# Patient Record
Sex: FEMALE | Race: WHITE | NOT HISPANIC OR LATINO | Employment: FULL TIME | URBAN - METROPOLITAN AREA
[De-identification: names, ages, dates, MRNs, and addresses within clinical notes are randomized per-mention and may not be internally consistent; named-entity substitution may affect disease eponyms.]

---

## 2023-06-13 PROBLEM — E78.5 HYPERLIPIDEMIA: Status: ACTIVE | Noted: 2023-06-13

## 2023-06-13 PROBLEM — E03.9 HYPOTHYROIDISM: Status: ACTIVE | Noted: 2023-06-13

## 2023-06-13 PROBLEM — R12 HEART BURN: Status: ACTIVE | Noted: 2023-06-13

## 2023-06-13 PROBLEM — I78.1 SPIDER VEINS: Status: ACTIVE | Noted: 2023-06-13

## 2023-06-13 PROBLEM — K21.9 ACID REFLUX: Status: ACTIVE | Noted: 2023-06-13

## 2023-06-13 PROBLEM — I83.93 VARICOSE VEINS OF LEGS: Status: ACTIVE | Noted: 2023-06-13

## 2023-06-13 PROBLEM — R79.89 ABNORMAL TSH: Status: ACTIVE | Noted: 2023-06-13

## 2023-06-13 RX ORDER — PANTOPRAZOLE SODIUM 40 MG/1
1 TABLET, DELAYED RELEASE ORAL DAILY
COMMUNITY
Start: 2022-04-14 | End: 2023-06-14 | Stop reason: SDUPTHER

## 2023-06-13 RX ORDER — PROGESTERONE 200 MG/1
1 CAPSULE ORAL DAILY
COMMUNITY
Start: 2022-08-03 | End: 2023-09-18 | Stop reason: ALTCHOICE

## 2023-06-13 RX ORDER — ESTRADIOL 1 MG/1
1 TABLET ORAL DAILY
COMMUNITY
End: 2023-06-14

## 2023-06-13 RX ORDER — LEVOTHYROXINE SODIUM 50 UG/1
1 TABLET ORAL DAILY
COMMUNITY
Start: 2023-03-08 | End: 2023-06-14 | Stop reason: SDUPTHER

## 2023-06-14 ENCOUNTER — LAB (OUTPATIENT)
Dept: LAB | Facility: LAB | Age: 58
End: 2023-06-14
Payer: COMMERCIAL

## 2023-06-14 ENCOUNTER — OFFICE VISIT (OUTPATIENT)
Dept: PRIMARY CARE | Facility: CLINIC | Age: 58
End: 2023-06-14
Payer: COMMERCIAL

## 2023-06-14 VITALS
OXYGEN SATURATION: 98 % | HEIGHT: 65 IN | WEIGHT: 154.38 LBS | DIASTOLIC BLOOD PRESSURE: 70 MMHG | BODY MASS INDEX: 25.72 KG/M2 | SYSTOLIC BLOOD PRESSURE: 120 MMHG | TEMPERATURE: 97.8 F | HEART RATE: 74 BPM | RESPIRATION RATE: 18 BRPM

## 2023-06-14 DIAGNOSIS — E03.8 OTHER SPECIFIED HYPOTHYROIDISM: ICD-10-CM

## 2023-06-14 DIAGNOSIS — E78.2 MIXED HYPERLIPIDEMIA: ICD-10-CM

## 2023-06-14 DIAGNOSIS — K21.9 GASTROESOPHAGEAL REFLUX DISEASE, UNSPECIFIED WHETHER ESOPHAGITIS PRESENT: ICD-10-CM

## 2023-06-14 DIAGNOSIS — Z00.00 ROUTINE GENERAL MEDICAL EXAMINATION AT A HEALTH CARE FACILITY: Primary | ICD-10-CM

## 2023-06-14 DIAGNOSIS — Z12.31 ENCOUNTER FOR SCREENING MAMMOGRAM FOR MALIGNANT NEOPLASM OF BREAST: ICD-10-CM

## 2023-06-14 LAB
ALANINE AMINOTRANSFERASE (SGPT) (U/L) IN SER/PLAS: 13 U/L (ref 7–45)
ALBUMIN (G/DL) IN SER/PLAS: 4 G/DL (ref 3.4–5)
ALKALINE PHOSPHATASE (U/L) IN SER/PLAS: 65 U/L (ref 33–110)
ANION GAP IN SER/PLAS: 12 MMOL/L (ref 10–20)
ASPARTATE AMINOTRANSFERASE (SGOT) (U/L) IN SER/PLAS: 16 U/L (ref 9–39)
BASOPHILS (10*3/UL) IN BLOOD BY AUTOMATED COUNT: 0.04 X10E9/L (ref 0–0.1)
BASOPHILS/100 LEUKOCYTES IN BLOOD BY AUTOMATED COUNT: 0.9 % (ref 0–2)
BILIRUBIN TOTAL (MG/DL) IN SER/PLAS: 0.5 MG/DL (ref 0–1.2)
CALCIUM (MG/DL) IN SER/PLAS: 8.8 MG/DL (ref 8.6–10.3)
CARBON DIOXIDE, TOTAL (MMOL/L) IN SER/PLAS: 28 MMOL/L (ref 21–32)
CHLORIDE (MMOL/L) IN SER/PLAS: 104 MMOL/L (ref 98–107)
CHOLESTEROL (MG/DL) IN SER/PLAS: 171 MG/DL (ref 0–199)
CHOLESTEROL IN HDL (MG/DL) IN SER/PLAS: 48.3 MG/DL
CHOLESTEROL/HDL RATIO: 3.5
CREATININE (MG/DL) IN SER/PLAS: 0.62 MG/DL (ref 0.5–1.05)
EOSINOPHILS (10*3/UL) IN BLOOD BY AUTOMATED COUNT: 0.06 X10E9/L (ref 0–0.7)
EOSINOPHILS/100 LEUKOCYTES IN BLOOD BY AUTOMATED COUNT: 1.3 % (ref 0–6)
ERYTHROCYTE DISTRIBUTION WIDTH (RATIO) BY AUTOMATED COUNT: 12.6 % (ref 11.5–14.5)
ERYTHROCYTE MEAN CORPUSCULAR HEMOGLOBIN CONCENTRATION (G/DL) BY AUTOMATED: 33.1 G/DL (ref 32–36)
ERYTHROCYTE MEAN CORPUSCULAR VOLUME (FL) BY AUTOMATED COUNT: 89 FL (ref 80–100)
ERYTHROCYTES (10*6/UL) IN BLOOD BY AUTOMATED COUNT: 4.6 X10E12/L (ref 4–5.2)
GFR FEMALE: >90 ML/MIN/1.73M2
GLUCOSE (MG/DL) IN SER/PLAS: 89 MG/DL (ref 74–99)
HEMATOCRIT (%) IN BLOOD BY AUTOMATED COUNT: 40.8 % (ref 36–46)
HEMOGLOBIN (G/DL) IN BLOOD: 13.5 G/DL (ref 12–16)
IMMATURE GRANULOCYTES/100 LEUKOCYTES IN BLOOD BY AUTOMATED COUNT: 0.2 % (ref 0–0.9)
LDL: 94 MG/DL (ref 0–99)
LEUKOCYTES (10*3/UL) IN BLOOD BY AUTOMATED COUNT: 4.6 X10E9/L (ref 4.4–11.3)
LYMPHOCYTES (10*3/UL) IN BLOOD BY AUTOMATED COUNT: 1.52 X10E9/L (ref 1.2–4.8)
LYMPHOCYTES/100 LEUKOCYTES IN BLOOD BY AUTOMATED COUNT: 33 % (ref 13–44)
MONOCYTES (10*3/UL) IN BLOOD BY AUTOMATED COUNT: 0.49 X10E9/L (ref 0.1–1)
MONOCYTES/100 LEUKOCYTES IN BLOOD BY AUTOMATED COUNT: 10.6 % (ref 2–10)
NEUTROPHILS (10*3/UL) IN BLOOD BY AUTOMATED COUNT: 2.49 X10E9/L (ref 1.2–7.7)
NEUTROPHILS/100 LEUKOCYTES IN BLOOD BY AUTOMATED COUNT: 54 % (ref 40–80)
PLATELETS (10*3/UL) IN BLOOD AUTOMATED COUNT: 292 X10E9/L (ref 150–450)
POTASSIUM (MMOL/L) IN SER/PLAS: 4.1 MMOL/L (ref 3.5–5.3)
PROTEIN TOTAL: 6.8 G/DL (ref 6.4–8.2)
SODIUM (MMOL/L) IN SER/PLAS: 140 MMOL/L (ref 136–145)
THYROTROPIN (MIU/L) IN SER/PLAS BY DETECTION LIMIT <= 0.05 MIU/L: 5.94 MIU/L (ref 0.44–3.98)
THYROXINE (T4) FREE (NG/DL) IN SER/PLAS: 0.83 NG/DL (ref 0.61–1.12)
TRIGLYCERIDE (MG/DL) IN SER/PLAS: 145 MG/DL (ref 0–149)
UREA NITROGEN (MG/DL) IN SER/PLAS: 8 MG/DL (ref 6–23)
VLDL: 29 MG/DL (ref 0–40)

## 2023-06-14 PROCEDURE — 90471 IMMUNIZATION ADMIN: CPT | Performed by: FAMILY MEDICINE

## 2023-06-14 PROCEDURE — 80053 COMPREHEN METABOLIC PANEL: CPT

## 2023-06-14 PROCEDURE — 90715 TDAP VACCINE 7 YRS/> IM: CPT | Performed by: FAMILY MEDICINE

## 2023-06-14 PROCEDURE — 85025 COMPLETE CBC W/AUTO DIFF WBC: CPT

## 2023-06-14 PROCEDURE — 1036F TOBACCO NON-USER: CPT | Performed by: FAMILY MEDICINE

## 2023-06-14 PROCEDURE — 84439 ASSAY OF FREE THYROXINE: CPT

## 2023-06-14 PROCEDURE — 84443 ASSAY THYROID STIM HORMONE: CPT

## 2023-06-14 PROCEDURE — 36415 COLL VENOUS BLD VENIPUNCTURE: CPT

## 2023-06-14 PROCEDURE — 80061 LIPID PANEL: CPT

## 2023-06-14 PROCEDURE — 99396 PREV VISIT EST AGE 40-64: CPT | Performed by: FAMILY MEDICINE

## 2023-06-14 RX ORDER — PANTOPRAZOLE SODIUM 40 MG/1
40 TABLET, DELAYED RELEASE ORAL DAILY
Qty: 90 TABLET | Refills: 1 | Status: SHIPPED | OUTPATIENT
Start: 2023-06-14

## 2023-06-14 RX ORDER — LEVOTHYROXINE SODIUM 50 UG/1
50 TABLET ORAL DAILY
Qty: 90 TABLET | Refills: 1 | Status: SHIPPED | OUTPATIENT
Start: 2023-06-14 | End: 2023-09-18 | Stop reason: ALTCHOICE

## 2023-06-14 ASSESSMENT — PATIENT HEALTH QUESTIONNAIRE - PHQ9
2. FEELING DOWN, DEPRESSED OR HOPELESS: NOT AT ALL
1. LITTLE INTEREST OR PLEASURE IN DOING THINGS: NOT AT ALL
SUM OF ALL RESPONSES TO PHQ9 QUESTIONS 1 AND 2: 0

## 2023-06-14 NOTE — PROGRESS NOTES
"Subjective   Patient ID: Diana Fournier is a 58 y.o. female who presents for annual exam and checkup along with her GERD and Hypothyroidism.  HPI  Annual exam and checkup  Hypothyroidism  Taking Synthroid 50 mcg  Taking on an empty stomach  No significant weight loss/gain  No heat/cold intolerances  No increase in hair loss/dry skin    Review of systems  ; Patient seen today for exam denies any problems with headaches or vision, denies any shortness of breath chest pain nausea or vomiting, no black stool no blood in the stool no heartburn type symptoms denies any problems with constipation or diarrhea, and no dysuria-type symptoms    The patient's allergies medications were reviewed with them today    The patient's social family and surgical history or also reviewed here today, along with her past medical history.     Objective     Alert and active in  no acute distress  HEENT TMs clear oropharynx negative nares clear no drainage noted neck supple  With no adenopathy   Heart regular rate and rhythm without murmur and no carotid bruits  Lungs- clear to auscultation bilaterally, no wheeze or rhonchi noted  Thyroid -negative masses or nodularity  Abdomen- soft times four quadrants , bowel sounds positive no masses or organomegaly, negative tenderness guarding or rebound  Neurological exam unremarkable- DTRs in upper and lower extremities within normal limits.   skin -no lesions noted      /70 (BP Location: Right arm, Patient Position: Sitting, BP Cuff Size: Adult)   Pulse 74   Temp 36.6 °C (97.8 °F) (Temporal)   Resp 18   Ht 1.651 m (5' 5\")   Wt 70 kg (154 lb 6 oz)   SpO2 98%   BMI 25.69 kg/m²     No Known Allergies    Assessment/Plan   Problem List Items Addressed This Visit       Acid reflux    Relevant Medications    pantoprazole (ProtoNix) 40 mg EC tablet    Hyperlipidemia    Relevant Orders    CBC and Auto Differential    Comprehensive Metabolic Panel    Lipid Panel    Hypothyroidism    Relevant " Medications    levothyroxine (Synthroid, Levoxyl) 50 mcg tablet    Other Relevant Orders    CBC and Auto Differential    Comprehensive Metabolic Panel    Lipid Panel    Thyroid Stimulating Hormone    Thyroxine, Free     Other Visit Diagnoses       Routine general medical examination at a health care facility    -  Primary    Relevant Orders    Tdap vaccine, age 10 years and older  (BOOSTRIX) (Completed)    Encounter for screening mammogram for malignant neoplasm of breast        Relevant Orders    BI mammo bilateral screening tomosynthesis              If anything worsens or changes please call us at once, follow up in the office as planned,

## 2023-06-16 DIAGNOSIS — E03.9 HYPOTHYROIDISM, UNSPECIFIED TYPE: ICD-10-CM

## 2023-06-16 RX ORDER — LEVOTHYROXINE SODIUM 75 UG/1
75 TABLET ORAL DAILY
Qty: 30 TABLET | Refills: 2 | Status: SHIPPED | OUTPATIENT
Start: 2023-06-16 | End: 2023-09-05 | Stop reason: SDUPTHER

## 2023-06-16 NOTE — TELEPHONE ENCOUNTER
----- Message from Davin Fabian DO sent at 6/15/2023  5:28 PM EDT -----  Reviewed her labs they all look really good cholesterol is much improved blood count sugar kidneys all good,, thyroid is still little bit lazy,, we can increase her to 75 mcg every day if she has lots of 50s she can take like an extra 1/week let me know we can make the adjustment for her and her prescription think that will help a little bit more energy also

## 2023-09-05 DIAGNOSIS — E03.9 HYPOTHYROIDISM, UNSPECIFIED TYPE: ICD-10-CM

## 2023-09-05 RX ORDER — LEVOTHYROXINE SODIUM 75 UG/1
75 TABLET ORAL DAILY
Qty: 30 TABLET | Refills: 3 | Status: SHIPPED | OUTPATIENT
Start: 2023-09-05 | End: 2023-12-06 | Stop reason: SDUPTHER

## 2023-09-05 NOTE — TELEPHONE ENCOUNTER
Rx Refill Request Telephone Encounter    Name:  Diana Fournier  : 1965     Medication Name:  Levothyroxine 75 mcg  Quantity (Optional):    30 Refill: 2  Directions (Optional):   Take 1 tablet (75 mcg) by mouth once daily.     Specific [facility] location:  [facility]    Date of last appointment:  23

## 2023-09-13 PROBLEM — Z01.419 ENCOUNTER FOR ANNUAL ROUTINE GYNECOLOGICAL EXAMINATION: Status: ACTIVE | Noted: 2023-09-13

## 2023-09-18 ENCOUNTER — PROCEDURE VISIT (OUTPATIENT)
Dept: PRIMARY CARE | Facility: CLINIC | Age: 58
End: 2023-09-18
Payer: COMMERCIAL

## 2023-09-18 VITALS
DIASTOLIC BLOOD PRESSURE: 80 MMHG | HEART RATE: 60 BPM | WEIGHT: 153.4 LBS | OXYGEN SATURATION: 99 % | SYSTOLIC BLOOD PRESSURE: 118 MMHG | HEIGHT: 65 IN | RESPIRATION RATE: 16 BRPM | BODY MASS INDEX: 25.56 KG/M2 | TEMPERATURE: 98.4 F

## 2023-09-18 DIAGNOSIS — H92.01 OTALGIA OF RIGHT EAR: ICD-10-CM

## 2023-09-18 DIAGNOSIS — Z12.4 SCREENING FOR CERVICAL CANCER: ICD-10-CM

## 2023-09-18 DIAGNOSIS — Z01.419 ENCOUNTER FOR ANNUAL ROUTINE GYNECOLOGICAL EXAMINATION: Primary | ICD-10-CM

## 2023-09-18 PROCEDURE — 99213 OFFICE O/P EST LOW 20 MIN: CPT | Performed by: NURSE PRACTITIONER

## 2023-09-18 PROCEDURE — 87624 HPV HI-RISK TYP POOLED RSLT: CPT

## 2023-09-18 PROCEDURE — 88175 CYTOPATH C/V AUTO FLUID REDO: CPT

## 2023-09-18 ASSESSMENT — ENCOUNTER SYMPTOMS
DIZZINESS: 0
FEVER: 0
CONSTIPATION: 0
SORE THROAT: 0
PALPITATIONS: 0
SHORTNESS OF BREATH: 0
DIARRHEA: 0
WEAKNESS: 0
VOMITING: 0
ABDOMINAL PAIN: 0
COUGH: 0
FATIGUE: 1
DIFFICULTY URINATING: 0
CHILLS: 0
SINUS PAIN: 0
DYSURIA: 0

## 2023-09-18 NOTE — PATIENT INSTRUCTIONS
"Cervical cancer screening tests    The Basics  Written by the doctors and editors at [facility]  What is cervical cancer screening? -- Screening tests look for cancer cells in the cervix. The cervix is the bottom part of the uterus, where it meets the vagina (figure 1).  Screening tests also look for cells that could turn into cancer, called \"precancer.\" They can find cervical cancer and precancer in the early stages, when it can be treated or even cured.  What tests are used to screen for cervical cancer? -- There are a few different ways to screen:  ?Pap test - This is sometimes called a \"Pap smear.\" It involves taking cells from the surface of the cervix and sending them to a lab. Then, an expert will check the cells under a microscope.  ?HPV test - HPV stands for \"human papillomavirus.\" Some types of this virus can cause cervical cancer. An HPV test involves testing cells from the cervix for certain types of HPV.  ?Combination test - This means doing a Pap and HPV test at the same time.  What happens during a Pap or HPV test? -- For both types of tests, your doctor will take cells from the surface of your cervix. To do this, they will gently insert a device called a \"speculum\" into your vagina. The device helps to push apart the walls of your vagina so that the doctor can see the cervix. Then, they will use a small tool to lightly scrape cells from the surface of your cervix. The tool looks like a small spatula or brush. This might be a little uncomfortable, but usually does not hurt.  When should I start being screened for cervical cancer? -- Most experts recommend that you start having Pap tests when you turn 21. Some experts recommend HPV tests instead of Pap tests, starting at age 25. But this option might not be available in many places. Your doctor or nurse can talk to you about your options.  You should start getting Pap tests at the recommended age, whether or not you have ever been sexually active. Also, " "you do not need to start cervical cancer screening before age 21, even if you became sexually active at a younger age.  What should I do to prepare for a Pap or HPV test? -- You do not need to do anything special to prepare. People sometimes hear that they should not have sex or put anything in their vagina for 2 days before a Pap test, but this is not necessary. Pap tests work fine even if you have had sex recently.  Your doctor might recommend scheduling your test for when you do not expect to have your period. But don't worry if you do have your period on the day of the test. Screening can usually still be done even if you are bleeding. Your doctor can talk with you and let you know what to do.  How often should I be screened for cervical cancer? -- That depends on how old you are and the results of your past tests.  ?If you are age 21 to 29, you should have a Pap test every 3 years. Or, if your doctor recommends HPV testing instead, you should have a test every 5 years beginning at age 25.  ?If you are age 30 or older, you can have a Pap test every 3 years. The other options are having an HPV test every 5 years or a combination Pap and HPV test every 5 years.  ?If you are age 65 or older, you can stop having Pap tests if:  You had Pap tests done regularly until you turned 65.  You had 3 normal Pap tests in a row, or 2 normal combination Pap and HPV tests over the past 10 years (if the most recent test was within the past 5 years).  You do not have other medical conditions that could weaken your immune system - These include taking certain medicines or having HIV.  You might also get a Pap test for reasons other than cervical cancer screening. For example, if you have abnormal vaginal bleeding, your doctor might do a Pap test to try to figure out the cause.  Do I need to be screened for cervical cancer if I had a hysterectomy? -- If you have had surgery called a \"hysterectomy\" to remove your uterus, ask your " "doctor if you need to keep getting screened. After a hysterectomy, you probably do not need screening if:  ?Your cervix was removed along with your uterus, and  ?You did not have cervical cancer or precancer (sometimes called \"dysplasia\")  If you're not sure, your doctor can help you figure out if you need to continue screening.  Do I need to get screening tests if I had the HPV vaccine? -- Yes. Getting the HPV vaccine lowers your chances of getting an HPV infection that could lead to cervical cancer. But it does not completely protect you. You should still be screened for cancer or precancer.  What if I have an abnormal Pap test result? -- Abnormal Pap tests are common, and most people with an abnormal Pap test do not have cancer. If your Pap test has cells that look abnormal, your doctor or nurse can do more tests to figure out what is causing this. They will decide what to do based on your age, what your Pap test shows, and the results of any other tests you had.  Follow-up tests might include:  ?An HPV test - If you haven't already had an HPV test, your doctor might order one. They might be able to do this on the cells already taken during your Pap test.  ?Another Pap test in 12 months - Sometimes, if you wait a year and have another Pap test, you could find that the abnormal cells are back to normal. You might also have an HPV test at the same time.  ?A colposcopy - For this test, the doctor or nurse will use a speculum to look at your cervix, just like during a Pap test. But they will look more closely using a device that looks like a microscope. It allows the doctor or nurse to see the cervix in more detail. During this test, the doctor or nurse might also take tiny samples of tissue from the cervix. This is called a \"biopsy.\" The tissue samples are checked in a lab.  If you do have cervical cancer or precancer, there are effective treatments available. If your condition was found early, there is a good " chance that you can be cured.  What if my HPV test comes back positive? -- Most people who have sex will be exposed to HPV at some point, and having HPV does not mean that you will definitely get cancer. For most people, HPV infection goes away on its own. But for some people, it does not. Long-lasting HPV infection increases your risk of cancer over time.  If your HPV test comes back positive, your doctor or nurse will talk with you about what to do. This will partly depend on whether your Pap test results were abnormal. If your HPV test is positive but your Pap test is normal, you might need to repeat the tests in 1 year so your doctor can see if anything has changed.

## 2023-09-18 NOTE — PROGRESS NOTES
"Subjective   Patient ID: Diana Fournier is a 58 y.o. female who presents for Gynecologic Exam (Patient presents in office for a pap smear. Patient admits she has had a abnormal pap smear in the past.) and Earache (Patient presents in office with a complaint of right ear pain. Patient admits for the last few days she has been experiencing hearing difficulties and ear pain. Patient denies any tinnitus. Patient has not had to taken any otc treatment. ).  Patient reports abnormal paps in the past; her last pap was normal.   Mammogram ordered by PCP at last appointment  Cologuard done April 2022      HPI     Review of Systems   Constitutional:  Positive for fatigue. Negative for chills and fever.   HENT:  Positive for ear pain. Negative for sinus pain and sore throat.    Respiratory:  Negative for cough and shortness of breath.    Cardiovascular:  Negative for chest pain and palpitations.   Gastrointestinal:  Negative for abdominal pain, constipation, diarrhea and vomiting.   Genitourinary:  Negative for difficulty urinating, dyspareunia, dysuria, pelvic pain and vaginal pain.   Skin:  Negative for rash.   Neurological:  Negative for dizziness and weakness.       Objective   /80 (BP Location: Left arm, Patient Position: Sitting, BP Cuff Size: Adult)   Pulse 60   Temp 36.9 °C (98.4 °F) (Temporal)   Resp 16   Ht 1.651 m (5' 5\")   Wt 69.6 kg (153 lb 6.4 oz)   SpO2 99%   BMI 25.53 kg/m²     Physical Exam  Vitals and nursing note reviewed. Exam conducted with a chaperone present.   Constitutional:       General: She is not in acute distress.     Appearance: Normal appearance.   HENT:      Right Ear: Tympanic membrane normal.      Left Ear: Tympanic membrane normal.   Neck:      Comments: No enlargement of thyroid noted  Cardiovascular:      Rate and Rhythm: Normal rate and regular rhythm.      Heart sounds: Normal heart sounds.   Pulmonary:      Effort: Pulmonary effort is normal.      Breath sounds: Normal " breath sounds.   Chest:   Breasts:     Right: Normal. No swelling, mass, skin change or tenderness.      Left: No swelling, mass, skin change or tenderness.   Abdominal:      General: Abdomen is flat.      Palpations: Abdomen is soft.      Tenderness: There is no abdominal tenderness.   Genitourinary:     General: Normal vulva.      Vagina: Normal.      Cervix: No cervical motion tenderness, friability, lesion or erythema.      Uterus: Normal.       Adnexa: Right adnexa normal and left adnexa normal.   Lymphadenopathy:      Cervical: No cervical adenopathy.      Upper Body:      Right upper body: No supraclavicular or axillary adenopathy.      Left upper body: No supraclavicular or axillary adenopathy.   Neurological:      Mental Status: She is alert.         Assessment/Plan   Problem List Items Addressed This Visit       Encounter for annual routine gynecological examination - Primary     Other Visit Diagnoses       Screening for cervical cancer        Relevant Orders    THINPREP PAP TEST        PAP obtained, will call patient with any abnormal result.   Encouraged to complete mammogram as ordered.   Last cologuard lab reviewed.   Reassured patient that there are no signs of infection of ear today.   Follow up as needed with any additional concerns.

## 2023-09-28 ENCOUNTER — TELEPHONE (OUTPATIENT)
Dept: PRIMARY CARE | Facility: CLINIC | Age: 58
End: 2023-09-28
Payer: COMMERCIAL

## 2023-09-28 ENCOUNTER — LAB (OUTPATIENT)
Dept: LAB | Facility: LAB | Age: 58
End: 2023-09-28
Payer: COMMERCIAL

## 2023-09-28 DIAGNOSIS — R79.89 ABNORMAL TSH: ICD-10-CM

## 2023-09-28 LAB
COMPLETE PATHOLOGY REPORT: NORMAL
CONVERTED CLINICAL DIAGNOSIS-HISTORY: NORMAL
CONVERTED DIAGNOSIS COMMENT: NORMAL
CONVERTED FINAL DIAGNOSIS: NORMAL
CONVERTED FINAL REPORT PDF LINK TO COPY AND PASTE: NORMAL
THYROTROPIN (MIU/L) IN SER/PLAS BY DETECTION LIMIT <= 0.05 MIU/L: 2.8 MIU/L (ref 0.44–3.98)

## 2023-09-28 PROCEDURE — 84443 ASSAY THYROID STIM HORMONE: CPT

## 2023-09-28 PROCEDURE — 86800 THYROGLOBULIN ANTIBODY: CPT

## 2023-09-28 PROCEDURE — 84436 ASSAY OF TOTAL THYROXINE: CPT

## 2023-09-28 PROCEDURE — 36415 COLL VENOUS BLD VENIPUNCTURE: CPT

## 2023-09-28 NOTE — TELEPHONE ENCOUNTER
Patient is calling because she had a question about her thyroid blood work. She set up a 6 month follow up with you for December but she wanted to know what you check when you do the thyroid blood test? She wanted to know if you could check for Hashimoto's. Could you order the lab so that she could get it done prior to her appointment in December?    Please advise  Thanks      Pt's # [phone]

## 2023-09-29 LAB — THYROXINE (T4) (UG/DL) IN SER/PLAS: 8.9 UG/DL (ref 4.5–11.1)

## 2023-09-30 ENCOUNTER — TELEPHONE (OUTPATIENT)
Dept: PRIMARY CARE | Facility: CLINIC | Age: 58
End: 2023-09-30
Payer: COMMERCIAL

## 2023-09-30 DIAGNOSIS — N76.0 BV (BACTERIAL VAGINOSIS): Primary | ICD-10-CM

## 2023-09-30 DIAGNOSIS — B96.89 BV (BACTERIAL VAGINOSIS): Primary | ICD-10-CM

## 2023-09-30 RX ORDER — METRONIDAZOLE 500 MG/1
500 TABLET ORAL 2 TIMES DAILY
Qty: 14 TABLET | Refills: 0 | Status: SHIPPED | OUTPATIENT
Start: 2023-09-30 | End: 2023-10-07

## 2023-09-30 NOTE — TELEPHONE ENCOUNTER
Spoke with patient regarding results- states she is having some discharge and would like to be treated.     Requesting antibiotic to be sent to [facility] in [place] please

## 2023-10-02 LAB — THYROGLOBULIN AB (IU/ML) IN SER/PLAS: 0.9 IU/ML (ref 0–4)

## 2023-10-06 ENCOUNTER — APPOINTMENT (OUTPATIENT)
Dept: RADIOLOGY | Facility: HOSPITAL | Age: 58
End: 2023-10-06
Payer: COMMERCIAL

## 2023-10-13 ENCOUNTER — APPOINTMENT (OUTPATIENT)
Dept: RADIOLOGY | Facility: HOSPITAL | Age: 58
End: 2023-10-13
Payer: COMMERCIAL

## 2023-11-01 ENCOUNTER — TELEPHONE (OUTPATIENT)
Dept: PRIMARY CARE | Facility: CLINIC | Age: 58
End: 2023-11-01
Payer: COMMERCIAL

## 2023-11-01 NOTE — TELEPHONE ENCOUNTER
Pt calling, she has an appointment on 12/1/23. She wanted to know if she needed to get her thyroid checked or any other labs before her appointment? If she did need labs, she would prefer to discuss the results in the office with you at her appointment.     Please advise

## 2023-12-04 NOTE — PROGRESS NOTES
"Subjective   Patient ID: Diana Fournier is a 58 y.o. female who presents for Hypothyroidism.    HPI    Hypothyroidism  Taking Synthroid 75 mcg  Taking on an empty stomach  No significant weight loss/gain  No heat/cold intolerances  Admits to increase in hair loss and dry skin.    Pt is having some redness in right eye  On going for 3 day  No pain or discomfort.    Flu declined     No other concern / question    Review of systems  ; Patient seen today for exam denies any problems with headaches or vision, denies any shortness of breath chest pain nausea or vomiting, no black stool no blood in the stool no heartburn type symptoms denies any problems with constipation or diarrhea, and no dysuria-type symptoms    The patient's allergies medications were reviewed with them today    The patient's social family and surgical history or also reviewed here today, along with her past medical history.     Objective     Alert and active in  no acute distress  HEENT TMs clear oropharynx negative nares clear no drainage noted neck supple  With no adenopathy   Heart regular rate and rhythm without murmur and no carotid bruits  Lungs- clear to auscultation bilaterally, no wheeze or rhonchi noted  Thyroid -negative masses or nodularity  Abdomen- soft times four quadrants , bowel sounds positive no masses or organomegaly, negative tenderness guarding or rebound  Neurological exam unremarkable- DTRs in upper and lower extremities within normal limits.   skin -no lesions noted      /72 (BP Location: Left arm, Patient Position: Sitting, BP Cuff Size: Adult)   Pulse 62   Temp 36.5 °C (97.7 °F) (Temporal)   Resp 16   Ht 1.651 m (5' 5\")   Wt 73.8 kg (162 lb 9.6 oz)   SpO2 97%   BMI 27.06 kg/m²     Allergies   Allergen Reactions    Latex Rash       Assessment/Plan   Problem List Items Addressed This Visit       Abnormal TSH    Acid reflux    Hyperlipidemia    Hypothyroidism     Other Visit Diagnoses       Redness of eye, right "              Refill Levothyroxine.     She can try to take Biotin.  She is aware this should not be taken by Levothyroxine.     Recommend washing her face with baby shampoo.  She can also use rewetting drops as needed.    Follow up in 6 months or sooner if necessary.    If anything worsens or changes please call us at once, follow up in the office as planned.    Scribe Attestation  By signing my name below, I, Laura Elias MA, Scribe   attest that this documentation has been prepared under the direction and in the presence of Davin Fabian DO.

## 2023-12-06 ENCOUNTER — OFFICE VISIT (OUTPATIENT)
Dept: PRIMARY CARE | Facility: CLINIC | Age: 58
End: 2023-12-06
Payer: COMMERCIAL

## 2023-12-06 VITALS
DIASTOLIC BLOOD PRESSURE: 72 MMHG | HEIGHT: 65 IN | WEIGHT: 162.6 LBS | RESPIRATION RATE: 16 BRPM | HEART RATE: 62 BPM | SYSTOLIC BLOOD PRESSURE: 106 MMHG | OXYGEN SATURATION: 97 % | TEMPERATURE: 97.7 F | BODY MASS INDEX: 27.09 KG/M2

## 2023-12-06 DIAGNOSIS — K21.9 GASTROESOPHAGEAL REFLUX DISEASE, UNSPECIFIED WHETHER ESOPHAGITIS PRESENT: ICD-10-CM

## 2023-12-06 DIAGNOSIS — E03.9 HYPOTHYROIDISM, UNSPECIFIED TYPE: ICD-10-CM

## 2023-12-06 DIAGNOSIS — H11.001 PTERYGIUM EYE, RIGHT: ICD-10-CM

## 2023-12-06 DIAGNOSIS — H57.89 REDNESS OF EYE, RIGHT: ICD-10-CM

## 2023-12-06 DIAGNOSIS — E78.5 HYPERLIPIDEMIA, UNSPECIFIED HYPERLIPIDEMIA TYPE: ICD-10-CM

## 2023-12-06 DIAGNOSIS — R79.89 ABNORMAL TSH: ICD-10-CM

## 2023-12-06 PROCEDURE — 3008F BODY MASS INDEX DOCD: CPT | Performed by: FAMILY MEDICINE

## 2023-12-06 PROCEDURE — 99213 OFFICE O/P EST LOW 20 MIN: CPT | Performed by: FAMILY MEDICINE

## 2023-12-06 PROCEDURE — 1036F TOBACCO NON-USER: CPT | Performed by: FAMILY MEDICINE

## 2023-12-06 RX ORDER — LEVOTHYROXINE SODIUM 75 UG/1
75 TABLET ORAL DAILY
Qty: 30 TABLET | Refills: 5 | Status: SHIPPED | OUTPATIENT
Start: 2023-12-06 | End: 2024-06-03

## 2024-01-11 ENCOUNTER — OFFICE VISIT (OUTPATIENT)
Dept: PRIMARY CARE | Facility: CLINIC | Age: 59
End: 2024-01-11
Payer: COMMERCIAL

## 2024-01-11 VITALS
HEIGHT: 66 IN | RESPIRATION RATE: 16 BRPM | HEART RATE: 50 BPM | BODY MASS INDEX: 25.78 KG/M2 | OXYGEN SATURATION: 100 % | DIASTOLIC BLOOD PRESSURE: 80 MMHG | TEMPERATURE: 97.8 F | WEIGHT: 160.4 LBS | SYSTOLIC BLOOD PRESSURE: 118 MMHG

## 2024-01-11 DIAGNOSIS — J01.90 ACUTE NON-RECURRENT SINUSITIS, UNSPECIFIED LOCATION: Primary | ICD-10-CM

## 2024-01-11 DIAGNOSIS — H92.01 OTALGIA, RIGHT: ICD-10-CM

## 2024-01-11 DIAGNOSIS — H10.33 ACUTE BACTERIAL CONJUNCTIVITIS OF BOTH EYES: ICD-10-CM

## 2024-01-11 PROCEDURE — 99213 OFFICE O/P EST LOW 20 MIN: CPT | Performed by: NURSE PRACTITIONER

## 2024-01-11 RX ORDER — POLYMYXIN B SULFATE AND TRIMETHOPRIM 1; 10000 MG/ML; [USP'U]/ML
1 SOLUTION OPHTHALMIC
Qty: 10 ML | Refills: 0 | Status: SHIPPED | OUTPATIENT
Start: 2024-01-11 | End: 2024-01-18

## 2024-01-11 RX ORDER — CEFDINIR 300 MG/1
300 CAPSULE ORAL 2 TIMES DAILY
Qty: 20 CAPSULE | Refills: 0 | Status: SHIPPED | OUTPATIENT
Start: 2024-01-11 | End: 2024-01-21

## 2024-01-11 ASSESSMENT — ENCOUNTER SYMPTOMS
SINUS PRESSURE: 0
CHILLS: 0
EYE DISCHARGE: 1
NAUSEA: 0
EYE REDNESS: 1
ABDOMINAL PAIN: 0
VOMITING: 0
HEADACHES: 0
FEVER: 0
WHEEZING: 0
SINUS PAIN: 0
WEAKNESS: 0
OCCASIONAL FEELINGS OF UNSTEADINESS: 0
PALPITATIONS: 0
FATIGUE: 0
SORE THROAT: 0
SHORTNESS OF BREATH: 0
DIZZINESS: 0
NUMBNESS: 0
COUGH: 0
DEPRESSION: 0
LOSS OF SENSATION IN FEET: 0
EYE PAIN: 0

## 2024-01-11 NOTE — PROGRESS NOTES
"Subjective   Patient ID: Diana Fournier is a 58 y.o. female who presents for Conjunctivitis (Patient presents in office with a complaint of possible conjunctivitis. Patient admits she has noticed yellow colored drainage. Patient has tried no otc relief. ) and Earache (Patient presents in office with a complaint of right ear pain. Patient admits she has been experiencing intermittent sharp pains. Patient has tried nothing otc for relief. ).    HPI   58-year-old female presents today complaining of possible pinkeye.  She states that earlier today, her left eye felt irritated.  She went to the restroom and noticed a thick yellow drainage from the eye.  The eye does seem slightly red.  She denies any crusting upon awakening this morning.  She is also complaining of nasal congestion that has been persisting over the last 2 weeks.  Over the last few days, she has been experiencing intermittent sharp pains in her right ear.  She denies any cough, no sore throat.  No fever or chills.  She denies any history of asthma.  She was around family members who were sick, she also works at a school.  She has not tried taking anything for symptom relief.    Review of Systems   Constitutional:  Negative for chills, fatigue and fever.   HENT:  Positive for congestion and ear pain. Negative for sinus pressure, sinus pain and sore throat.    Eyes:  Positive for discharge and redness. Negative for pain and visual disturbance.   Respiratory:  Negative for cough, shortness of breath and wheezing.    Cardiovascular:  Negative for chest pain and palpitations.   Gastrointestinal:  Negative for abdominal pain, nausea and vomiting.   Neurological:  Negative for dizziness, weakness, numbness and headaches.       Objective   /80 (BP Location: Left arm, Patient Position: Sitting, BP Cuff Size: Adult)   Pulse 50   Temp 36.6 °C (97.8 °F) (Temporal)   Resp 16   Ht 1.676 m (5' 6\")   Wt 72.8 kg (160 lb 6.4 oz)   SpO2 100%   BMI 25.89 " kg/m²     Physical Exam  Vitals and nursing note reviewed.   Constitutional:       General: She is not in acute distress.     Appearance: Normal appearance.   HENT:      Right Ear: Tympanic membrane normal.      Left Ear: Tympanic membrane normal.      Nose: Congestion present.      Mouth/Throat:      Pharynx: No oropharyngeal exudate or posterior oropharyngeal erythema.   Eyes:      General:         Right eye: Discharge present.         Left eye: Discharge present.     Extraocular Movements: Extraocular movements intact.      Pupils: Pupils are equal, round, and reactive to light.      Comments: Left sclera/conjunctiva erythematous. Yellow drainage noted in eye corners.    Cardiovascular:      Rate and Rhythm: Normal rate and regular rhythm.      Heart sounds: Normal heart sounds.   Pulmonary:      Effort: Pulmonary effort is normal.      Breath sounds: Normal breath sounds. No wheezing, rhonchi or rales.   Lymphadenopathy:      Cervical: No cervical adenopathy.   Skin:     General: Skin is warm and dry.   Neurological:      Mental Status: She is alert.   Psychiatric:         Mood and Affect: Mood normal.         Behavior: Behavior normal.         Assessment/Plan   Problem List Items Addressed This Visit             ICD-10-CM    BMI 25.0-25.9,adult Z68.25     Other Visit Diagnoses         Codes    Acute non-recurrent sinusitis, unspecified location    -  Primary J01.90    Relevant Medications    cefdinir (Omnicef) 300 mg capsule    Acute bacterial conjunctivitis of both eyes     H10.33    Relevant Medications    polymyxin B sulf-trimethoprim (Polytrim) ophthalmic solution    Otalgia, right     H92.01        Sinusitis:  Will treat with cefdinir.  Increase rest and fluids.  Follow-up with PCP in 1 week with any persisting symptoms, or sooner with any additional concerns.  Conjunctivitis: Will treat with Polytrim.  Encouraged good handwashing.  Work note provided for today.  Follow-up with PCP or eye doctor with any  persisting symptoms.  If developing visual changes, to ER.

## 2024-01-11 NOTE — PATIENT INSTRUCTIONS
Today you were seen for a sinus infection.  Start antibiotic as directed and take until gone.  Increase rest and fluids.  Follow-up with your primary care provider in 1 week with any persisting symptoms, or sooner with any additional concerns.   Today you were also seen for conjunctivitis.  Start antibiotic eyedrops as directed.  Make sure to wash hands frequently and thoroughly.  Wash all sheets, towels, clothes in hot soapy water.  Follow-up with your PCP in 1 week with any persisting symptoms, or sooner with any additional concerns.  If developing any eye pain or visual changes/blurred vision, proceed to emergency department for further evaluation/treatment.

## 2024-02-15 ENCOUNTER — OFFICE VISIT (OUTPATIENT)
Dept: PRIMARY CARE | Facility: CLINIC | Age: 59
End: 2024-02-15
Payer: COMMERCIAL

## 2024-02-15 VITALS
OXYGEN SATURATION: 98 % | SYSTOLIC BLOOD PRESSURE: 136 MMHG | RESPIRATION RATE: 16 BRPM | BODY MASS INDEX: 27.26 KG/M2 | HEIGHT: 65 IN | WEIGHT: 163.6 LBS | DIASTOLIC BLOOD PRESSURE: 84 MMHG | TEMPERATURE: 97.8 F | HEART RATE: 68 BPM

## 2024-02-15 DIAGNOSIS — J00 NASOPHARYNGITIS: ICD-10-CM

## 2024-02-15 DIAGNOSIS — J01.00 ACUTE NON-RECURRENT MAXILLARY SINUSITIS: Primary | ICD-10-CM

## 2024-02-15 DIAGNOSIS — R05.9 COUGH IN ADULT PATIENT: ICD-10-CM

## 2024-02-15 PROCEDURE — 99213 OFFICE O/P EST LOW 20 MIN: CPT | Performed by: NURSE PRACTITIONER

## 2024-02-15 RX ORDER — AMOXICILLIN 875 MG/1
875 TABLET, FILM COATED ORAL 2 TIMES DAILY
Qty: 20 TABLET | Refills: 0 | Status: SHIPPED | OUTPATIENT
Start: 2024-02-15 | End: 2024-02-25

## 2024-02-15 ASSESSMENT — ENCOUNTER SYMPTOMS
DEPRESSION: 0
LOSS OF SENSATION IN FEET: 0
OCCASIONAL FEELINGS OF UNSTEADINESS: 0

## 2024-02-15 ASSESSMENT — PATIENT HEALTH QUESTIONNAIRE - PHQ9
1. LITTLE INTEREST OR PLEASURE IN DOING THINGS: NOT AT ALL
1. LITTLE INTEREST OR PLEASURE IN DOING THINGS: NOT AT ALL
2. FEELING DOWN, DEPRESSED OR HOPELESS: NOT AT ALL
2. FEELING DOWN, DEPRESSED OR HOPELESS: NOT AT ALL
SUM OF ALL RESPONSES TO PHQ9 QUESTIONS 1 AND 2: 0
SUM OF ALL RESPONSES TO PHQ9 QUESTIONS 1 AND 2: 0

## 2024-02-15 NOTE — PROGRESS NOTES
Subjective   Patient ID: Diana Fournier is a 58 y.o. female who is with a chief complaint of symptoms of respiratory tract infection.     HPI  Patient is a 58 y.o. female who CONSULTED AT [facility] today. Patient is with complaints of nasal congestion, nasal discharge (gone now), maxillary sinus pain, sore throat, post nasal drip, cough, and fatigue. She denies having any headache, muscle ache, loss of sense of taste, loss of sense of smell, diarrhea, chills nor fever. Patient states that present condition started about 5 days ago after being exposed to her son's girlfriend who is having similar symptoms. she denies shortness of breath, chest pain, palpitations, nor edema. she stated that she  tried OTC medications which afforded only slight relief of symptoms. she denies nausea, vomiting, abdominal pain, nor any other symptoms.    Patient states she had her COVID vaccine.  Patient states she have not yet received flu shot for this season.    Review of Systems  General: no weight loss, generally healthy, (+) fatigue  Head:  no headaches, (+) maxillary sinus pain, no vertigo, no injury  Eyes: no diplopia, no tearing, no pain,   Ears: no change in hearing, no tinnitus, no bleeding, no vertigo  Mouth:  no dental difficulties, no gingival bleeding, (+) sore throat, no loss of sense of taste, (+) post nasal drip,   Nose: (+) congestion, (+) hx discharge, no bleeding, no obstruction, no loss of sense of smell  Neck: no stiffness, no pain, no tenderness, no masses, no bruit  Pulmonary: no dyspnea, no wheezing, no hemoptysis, (+) cough  Cardiovascular: no chest pain, no palpitations, no syncope, no orthopnea  Gastrointestinal: no change in appetite, no dysphagia, no abdominal pains, no diarrhea, no emesis, no melena  Genito Urinary: no dysuria, no urinary urgency, no nocturia, no incontinence, no change in nature of urine  Musculoskeletal: no muscle ache, no joint pain, no limitation of range of  motion, no paresthesia, no numbness  Constitutional: no fever, no chills, no night sweats    Objective   Physical Exam  General: ambulatory, in no acute distress  Head: normocephalic, no lesions, (+) maxillary sinus tenderness  Eyes: pink palpebral conjunctiva, anicteric sclerae, PERRLA, EOM's full  Ears: clear external auditory canals, no ear discharge, no bleeding from the ears, tympanic membrane intact  Nose: (+) congested nasal mucosa, (+) yellow mucoid nasal discharge, no bleeding, no obstruction  Throat: (+) erythema, and (+) exudate on posterior pharyngeal wall, no lesion  Neck: supple, no masses, no bruits, no CLADP  Chest: symmetrical chest expansion, no lagging, no retractions, clear breath sounds, no rales, no wheezes    Assessment/Plan   Problem List Items Addressed This Visit    None  Visit Diagnoses         Codes    Acute non-recurrent maxillary sinusitis    -  Primary J01.00    Relevant Medications    amoxicillin (Amoxil) 875 mg tablet    Nasopharyngitis     J00    Relevant Medications    amoxicillin (Amoxil) 875 mg tablet    Cough in adult patient     R05.9    Relevant Medications    amoxicillin (Amoxil) 875 mg tablet        DISCHARGE SUMMARY:   Patient was seen and examined. Diagnosis, treatment, treatment options, and possible complications of today's illness discussed and explained to patient. Patient to take medication/s associated with this visit. Patient may take OTC decongestant of choice as needed. Patient may also take OTC analgesic/antipyretic if needed for pain/fever. Advised to increase oral fluid intake. Advised steam inhalation if needed to relieve congestion. Advised warm saline gargle if needed to relieve throat discomfort. Advised Listerine antiseptic mouthwash gargle TID. Patient may use Cepacol oral spray as needed to relieve throat discomfort. Patient was advised to discard the old toothbrush and use a new toothbrush beginning on the third of antibiotics. Advised to come back if  with worsening or persistent symptoms. Patient verbalized understanding of plan of care.    Patient to come back in 7 - 10 days if needed for worsening symptoms.           ESTELLE Alvarez-CNP 02/15/24 3:31 PM

## 2024-02-15 NOTE — PROGRESS NOTES
"Subjective   Patient ID: Diana Fournier is a 58 y.o. female who presents for No chief complaint on file..  Symptoms: sinus , ear pain, congestion, cough, sore throat,   Length of symptoms: 4 days ago  OTC: coricidin with mild help  Related information:    HPI     Review of Systems    Objective   /84   Pulse 68   Temp 36.6 °C (97.8 °F)   Resp 16   Ht 1.651 m (5' 5\")   Wt 74.2 kg (163 lb 9.6 oz)   SpO2 98%   BMI 27.22 kg/m²     Physical Exam    Assessment/Plan          "

## 2024-03-14 ENCOUNTER — TELEPHONE (OUTPATIENT)
Dept: PRIMARY CARE | Facility: CLINIC | Age: 59
End: 2024-03-14
Payer: COMMERCIAL

## 2024-03-14 NOTE — TELEPHONE ENCOUNTER
Pt calling, she is requesting a colonoscopy. She had a cologuard in 2022 that was normal. Her sister recently had a colonoscopy, so the patient wanted to get one done. Ok to order colonoscopy?    Please advise    Last office visit: 12/6/2023

## 2024-03-22 ENCOUNTER — APPOINTMENT (OUTPATIENT)
Dept: RADIOLOGY | Facility: HOSPITAL | Age: 59
End: 2024-03-22
Payer: COMMERCIAL

## 2024-03-30 ENCOUNTER — HOSPITAL ENCOUNTER (OUTPATIENT)
Dept: RADIOLOGY | Facility: HOSPITAL | Age: 59
Discharge: HOME | End: 2024-03-30
Payer: COMMERCIAL

## 2024-03-30 VITALS — WEIGHT: 155 LBS | BODY MASS INDEX: 25.83 KG/M2 | HEIGHT: 65 IN

## 2024-03-30 DIAGNOSIS — Z12.31 ENCOUNTER FOR SCREENING MAMMOGRAM FOR MALIGNANT NEOPLASM OF BREAST: ICD-10-CM

## 2024-03-30 PROCEDURE — 77067 SCR MAMMO BI INCL CAD: CPT | Mod: BILATERAL PROCEDURE | Performed by: RADIOLOGY

## 2024-03-30 PROCEDURE — 77067 SCR MAMMO BI INCL CAD: CPT

## 2024-03-30 PROCEDURE — 77063 BREAST TOMOSYNTHESIS BI: CPT | Mod: BILATERAL PROCEDURE | Performed by: RADIOLOGY

## 2024-05-16 ENCOUNTER — TELEPHONE (OUTPATIENT)
Dept: PRIMARY CARE | Facility: CLINIC | Age: 59
End: 2024-05-16
Payer: COMMERCIAL

## 2024-05-16 DIAGNOSIS — R79.89 ABNORMAL TSH: ICD-10-CM

## 2024-05-16 DIAGNOSIS — R53.83 OTHER FATIGUE: ICD-10-CM

## 2024-05-16 DIAGNOSIS — E03.9 HYPOTHYROIDISM, UNSPECIFIED TYPE: ICD-10-CM

## 2024-05-16 DIAGNOSIS — E78.5 HYPERLIPIDEMIA, UNSPECIFIED HYPERLIPIDEMIA TYPE: ICD-10-CM

## 2024-05-16 NOTE — TELEPHONE ENCOUNTER
PATIENT OF DR. DEY'S IS CALLING - SHE IS FEELING EXTRA TIRED AND HAS GAINED A LITTLE WEIGHT- HAD HER LAST PHYSICAL WITH DR. DEY ON 6/14/23 - THYROID BLOOD WORK ON 6/14/23 AND 9/28/23 - WONDERING IF SHE NEEDS ANY FURTHER BLOOD WORK BECAUSE OF THE SYMPTOMS SHE IS EXPERIENCING.  PLEASE ADVISE.      [phone]

## 2024-05-28 ENCOUNTER — LAB (OUTPATIENT)
Dept: LAB | Facility: LAB | Age: 59
End: 2024-05-28
Payer: COMMERCIAL

## 2024-05-28 NOTE — TELEPHONE ENCOUNTER
Patient aware. Labs order placed     DO Craig Santacruza[address] [facility]1 Clinical Support Staff1 hour ago (11:09 AM)       Okay to order her blood test CBC CMP lipid profile TSH and T4 B12 and vitamin D then follow-up in the office and we can go over things

## 2024-06-18 ENCOUNTER — LAB (OUTPATIENT)
Dept: LAB | Facility: LAB | Age: 59
End: 2024-06-18
Payer: COMMERCIAL

## 2024-06-18 DIAGNOSIS — R79.89 ABNORMAL TSH: ICD-10-CM

## 2024-06-18 DIAGNOSIS — E78.5 HYPERLIPIDEMIA, UNSPECIFIED HYPERLIPIDEMIA TYPE: ICD-10-CM

## 2024-06-18 DIAGNOSIS — R53.83 OTHER FATIGUE: ICD-10-CM

## 2024-06-18 DIAGNOSIS — E03.9 HYPOTHYROIDISM, UNSPECIFIED TYPE: ICD-10-CM

## 2024-06-18 LAB
25(OH)D3 SERPL-MCNC: 28 NG/ML (ref 30–100)
ALBUMIN SERPL BCP-MCNC: 4.2 G/DL (ref 3.4–5)
ALP SERPL-CCNC: 63 U/L (ref 33–136)
ALT SERPL W P-5'-P-CCNC: 13 U/L (ref 7–45)
ANION GAP SERPL CALC-SCNC: 9 MMOL/L (ref 10–20)
AST SERPL W P-5'-P-CCNC: 15 U/L (ref 9–39)
BASOPHILS # BLD AUTO: 0.05 X10*3/UL (ref 0–0.1)
BASOPHILS NFR BLD AUTO: 1 %
BILIRUB SERPL-MCNC: 0.6 MG/DL (ref 0–1.2)
BUN SERPL-MCNC: 13 MG/DL (ref 6–23)
CALCIUM SERPL-MCNC: 9.3 MG/DL (ref 8.6–10.3)
CHLORIDE SERPL-SCNC: 107 MMOL/L (ref 98–107)
CHOLEST SERPL-MCNC: 220 MG/DL (ref 0–199)
CHOLESTEROL/HDL RATIO: 3
CO2 SERPL-SCNC: 29 MMOL/L (ref 21–32)
CREAT SERPL-MCNC: 0.82 MG/DL (ref 0.5–1.05)
EGFRCR SERPLBLD CKD-EPI 2021: 82 ML/MIN/1.73M*2
EOSINOPHIL # BLD AUTO: 0.11 X10*3/UL (ref 0–0.7)
EOSINOPHIL NFR BLD AUTO: 2.1 %
ERYTHROCYTE [DISTWIDTH] IN BLOOD BY AUTOMATED COUNT: 12.6 % (ref 11.5–14.5)
GLUCOSE SERPL-MCNC: 88 MG/DL (ref 74–99)
HCT VFR BLD AUTO: 41.5 % (ref 36–46)
HDLC SERPL-MCNC: 73.9 MG/DL
HGB BLD-MCNC: 13.7 G/DL (ref 12–16)
IMM GRANULOCYTES # BLD AUTO: 0.01 X10*3/UL (ref 0–0.7)
IMM GRANULOCYTES NFR BLD AUTO: 0.2 % (ref 0–0.9)
LDLC SERPL CALC-MCNC: 130 MG/DL
LYMPHOCYTES # BLD AUTO: 1.59 X10*3/UL (ref 1.2–4.8)
LYMPHOCYTES NFR BLD AUTO: 30.8 %
MCH RBC QN AUTO: 29.7 PG (ref 26–34)
MCHC RBC AUTO-ENTMCNC: 33 G/DL (ref 32–36)
MCV RBC AUTO: 90 FL (ref 80–100)
MONOCYTES # BLD AUTO: 0.52 X10*3/UL (ref 0.1–1)
MONOCYTES NFR BLD AUTO: 10.1 %
NEUTROPHILS # BLD AUTO: 2.89 X10*3/UL (ref 1.2–7.7)
NEUTROPHILS NFR BLD AUTO: 55.8 %
NON HDL CHOLESTEROL: 146 MG/DL (ref 0–149)
NRBC BLD-RTO: 0 /100 WBCS (ref 0–0)
PLATELET # BLD AUTO: 279 X10*3/UL (ref 150–450)
POTASSIUM SERPL-SCNC: 4.3 MMOL/L (ref 3.5–5.3)
PROT SERPL-MCNC: 6.7 G/DL (ref 6.4–8.2)
RBC # BLD AUTO: 4.62 X10*6/UL (ref 4–5.2)
SODIUM SERPL-SCNC: 141 MMOL/L (ref 136–145)
T4 FREE SERPL-MCNC: 1.02 NG/DL (ref 0.61–1.12)
TRIGL SERPL-MCNC: 83 MG/DL (ref 0–149)
TSH SERPL-ACNC: 4.67 MIU/L (ref 0.44–3.98)
VIT B12 SERPL-MCNC: 206 PG/ML (ref 211–911)
VLDL: 17 MG/DL (ref 0–40)
WBC # BLD AUTO: 5.2 X10*3/UL (ref 4.4–11.3)

## 2024-06-18 PROCEDURE — 36415 COLL VENOUS BLD VENIPUNCTURE: CPT

## 2024-06-18 PROCEDURE — 80061 LIPID PANEL: CPT

## 2024-06-18 PROCEDURE — 85025 COMPLETE CBC W/AUTO DIFF WBC: CPT

## 2024-06-18 PROCEDURE — 80053 COMPREHEN METABOLIC PANEL: CPT

## 2024-06-18 PROCEDURE — 84439 ASSAY OF FREE THYROXINE: CPT

## 2024-06-18 PROCEDURE — 84443 ASSAY THYROID STIM HORMONE: CPT

## 2024-06-18 PROCEDURE — 82306 VITAMIN D 25 HYDROXY: CPT

## 2024-06-18 PROCEDURE — 82607 VITAMIN B-12: CPT

## 2024-06-20 NOTE — PROGRESS NOTES
"Subjective   Patient ID: Diana Fournier is a 59 y.o. female who presents for Annual Exam and Hypothyroidism.    HPI    Patient presents today for an annual exam and hypothyroidism follow up.  She does try to eat a generally healthy diet. She does not exercise. Denies any significant weight changes. Last eye exam was 6 months ago. Last dental exam was a while ago. Had BW 6/18/24.    She has been feeling more fatigued.     Review of systems  ; Patient seen today for exam denies any problems with headaches or vision, denies any shortness of breath chest pain nausea or vomiting, no black stool no blood in the stool no heartburn type symptoms denies any problems with constipation or diarrhea, and no dysuria-type symptoms    The patient's allergies medications were reviewed with them today    The patient's social family and surgical history or also reviewed here today, along with her past medical history.     Objective     Alert and active in  no acute distress  HEENT TMs clear oropharynx negative nares clear no drainage noted neck supple  With no adenopathy   Heart regular rate and rhythm without murmur and no carotid bruits  Lungs- clear to auscultation bilaterally, no wheeze or rhonchi noted  Thyroid -negative masses or nodularity  Abdomen- soft times four quadrants , bowel sounds positive no masses or organomegaly, negative tenderness guarding or rebound  Neurological exam unremarkable- DTRs in upper and lower extremities within normal limits.   skin -no lesions noted      /70   Pulse 94   Resp 18   Ht 1.651 m (5' 5\")   Wt 75.8 kg (167 lb)   SpO2 98%   BMI 27.79 kg/m²     Allergies   Allergen Reactions    Latex Rash       Assessment/Plan   Problem List Items Addressed This Visit       Hyperlipidemia    Hypothyroidism    Relevant Medications    Synthroid 75 mcg tablet     Other Visit Diagnoses       Routine general medical examination at a health care facility        BMI 27.0-27.9,adult          "     Switch Levothyroxine to Synthroid.     Reviewed BW.     Recommend 2000 units of Vitamin D3 daily.   Recommend Vitamin B12 2172-6010 mcg sublingually daily.    If anything worsens or changes please call us at once, follow up in the office as planned.    Scribe Attestation  By signing my name below, I, Laura Elias MA, Scribe   attest that this documentation has been prepared under the direction and in the presence of Davin Fabian DO.

## 2024-06-21 ENCOUNTER — OFFICE VISIT (OUTPATIENT)
Dept: PRIMARY CARE | Facility: CLINIC | Age: 59
End: 2024-06-21
Payer: COMMERCIAL

## 2024-06-21 VITALS
HEART RATE: 94 BPM | DIASTOLIC BLOOD PRESSURE: 70 MMHG | BODY MASS INDEX: 27.82 KG/M2 | SYSTOLIC BLOOD PRESSURE: 104 MMHG | OXYGEN SATURATION: 98 % | RESPIRATION RATE: 18 BRPM | WEIGHT: 167 LBS | HEIGHT: 65 IN

## 2024-06-21 DIAGNOSIS — E03.9 HYPOTHYROIDISM, UNSPECIFIED TYPE: ICD-10-CM

## 2024-06-21 DIAGNOSIS — E03.8 OTHER SPECIFIED HYPOTHYROIDISM: ICD-10-CM

## 2024-06-21 DIAGNOSIS — E78.2 MIXED HYPERLIPIDEMIA: ICD-10-CM

## 2024-06-21 DIAGNOSIS — Z00.00 ROUTINE GENERAL MEDICAL EXAMINATION AT A HEALTH CARE FACILITY: Primary | ICD-10-CM

## 2024-06-21 PROCEDURE — 1036F TOBACCO NON-USER: CPT | Performed by: FAMILY MEDICINE

## 2024-06-21 PROCEDURE — 3008F BODY MASS INDEX DOCD: CPT | Performed by: FAMILY MEDICINE

## 2024-06-21 PROCEDURE — 99396 PREV VISIT EST AGE 40-64: CPT | Performed by: FAMILY MEDICINE

## 2024-06-21 RX ORDER — LEVOTHYROXINE SODIUM 75 UG/1
75 TABLET ORAL DAILY
Qty: 30 TABLET | Refills: 5 | Status: SHIPPED | OUTPATIENT
Start: 2024-06-21 | End: 2025-06-21

## 2024-06-21 ASSESSMENT — PATIENT HEALTH QUESTIONNAIRE - PHQ9
1. LITTLE INTEREST OR PLEASURE IN DOING THINGS: NOT AT ALL
2. FEELING DOWN, DEPRESSED OR HOPELESS: NOT AT ALL
SUM OF ALL RESPONSES TO PHQ9 QUESTIONS 1 AND 2: 0

## 2024-08-22 ENCOUNTER — TELEPHONE (OUTPATIENT)
Dept: PRIMARY CARE | Facility: CLINIC | Age: 59
End: 2024-08-22
Payer: COMMERCIAL

## 2024-08-22 NOTE — TELEPHONE ENCOUNTER
Patient called concerned about her weight she is now at 172, she wants to know if  she can have  something to help her lose weight? Please advise?

## 2024-08-22 NOTE — TELEPHONE ENCOUNTER
Davin Fabian, DO   to Hedy Wylie  Do [id number] [facility]1 Clerical       8/22/24  2:06 PM   She should make an appointment and we can discuss these things but maybe check with her insurance company first to see if any the medications are covered such as Wegovy or Zepp bound    Patient called back and is aware.  Made an appointment for 8/30/24

## 2024-08-30 ENCOUNTER — APPOINTMENT (OUTPATIENT)
Dept: PRIMARY CARE | Facility: CLINIC | Age: 59
End: 2024-08-30
Payer: COMMERCIAL

## 2024-12-26 DIAGNOSIS — E03.8 OTHER SPECIFIED HYPOTHYROIDISM: ICD-10-CM

## 2024-12-26 RX ORDER — LEVOTHYROXINE SODIUM 75 UG/1
TABLET ORAL
Qty: 30 TABLET | Refills: 2 | Status: SHIPPED | OUTPATIENT
Start: 2024-12-26 | End: 2024-12-26 | Stop reason: SDUPTHER

## 2024-12-26 RX ORDER — LEVOTHYROXINE SODIUM 75 UG/1
75 TABLET ORAL DAILY
Qty: 30 TABLET | Refills: 0 | Status: SHIPPED | OUTPATIENT
Start: 2024-12-26

## 2024-12-26 NOTE — TELEPHONE ENCOUNTER
MEDICATION PENDED  Rx Refill Request Telephone Encounter    Name:  Diana Fournier  : 1965     Medication Name:  Synthroid 75 mcg tablet [[id number]]    Order Details    NO GENERIC  Dose: 75 mcg Route: oral Frequency: Daily   Dispense Quantity: 30 tablet Refills: 5          Sig: Take 1 tablet (75 mcg) by mouth early in the morning.. Take on an empty stomach at the same time each day, either 30 to 60 minutes prior to breakfast         ALLERGIES:   latex    Specific Pharmacy location:  [id number] - [place], [place] - [address], [place] [address]     Date of last appointment:  24  Date of next appointment:  1/3/24    Best number to reach patient:  [phone]

## 2024-12-31 NOTE — PROGRESS NOTES
Subjective   Patient ID: Diana Fournier is a 59 y.o. female who presents for Hypothyroidism, GERD, Sinus Problem, and Weight Loss.  HPI  Hypothyroidism  Taking Synthroid 75 mcg every day  Taking on an empty stomach  No significant weight loss/gain  No heat/cold intolerances  No increase in hair loss/dry skin  She has been feeling fatigued.     Pt wants to discuss weight loss medication   We discussed the option of Wegovy as well as importance of diet and exercise to help in weight loss.     Pt reports sinus issues  ongoing for 3 days   other symptoms includes headaches, sinus pressure, and some drainage  Denies fevers, chills, or green/yellow mucus  pt is not taking OTC for symptoms    Pt admits to having heartburn issues.  Has pantoprazole at home but currently not taking as she is wondering if the med .  States that her previously noted right-sided epigastric pain has resolved.     Pt is using Vitamin B12 2500 mcg sublingually  She wishes to switch to Vitamin B12 shots as she tends to forget taking the sublingual pill.     Last Pap on 23 was normal.         No other concern /question     Review of systems  ; Patient seen today for exam denies any problems with headaches or vision, denies any shortness of breath chest pain nausea or vomiting, no black stool no blood in the stool no heartburn type symptoms denies any problems with constipation or diarrhea, and no dysuria-type symptoms    The patient's allergies medications were reviewed with them today    The patient's social family and surgical history or also reviewed here today, along with her past medical history.     Objective     Alert and active in  no acute distress  HEENT TMs clear oropharynx negative nares clear POS  drainage noted neck supple  With no adenopathy   Heart regular rate and rhythm without murmur and no carotid bruits  Lungs- clear to auscultation bilaterally, no wheeze or rhonchi noted  Thyroid -negative masses or  "nodularity  Abdomen- soft times four quadrants , bowel sounds positive no masses or organomegaly, negative tenderness guarding or rebound  Neurological exam unremarkable- DTRs in upper and lower extremities within normal limits.   skin -no lesions noted      /76 (BP Location: Left arm, Patient Position: Sitting, BP Cuff Size: Adult)   Pulse 71   Temp 36.6 °C (97.9 °F) (Temporal)   Resp 16   Ht 1.651 m (5' 5\")   Wt 77.6 kg (171 lb)   SpO2 97%   BMI 28.46 kg/m²     Allergies   Allergen Reactions    Latex Rash       Assessment/Plan   Problem List Items Addressed This Visit       Acid reflux    Relevant Medications    pantoprazole (ProtoNix) 40 mg EC tablet    Hypothyroidism    Relevant Medications    Synthroid 75 mcg tablet    Other Relevant Orders    TSH    T4, free    BMI 28.0-28.9,adult    Relevant Medications    semaglutide, weight loss, (Wegovy) 0.25 mg/0.5 mL pen injector     Other Visit Diagnoses       B12 deficiency    -  Primary    Relevant Orders    Vitamin B12    Vitamin D deficiency        Relevant Orders    Vitamin D 25-Hydroxy,Total (for eval of Vitamin D levels)            Labs have been ordered, she/he will have these performed and we will contact her/him with results.  (TSH, T4, Vitamin D, Vitamin B12)    Refilled Synthroid, pantoprazole.  Continue current dose.   She will let us know if Synthroid is covered by her insurance.    Recommended 2000 units of Vitamin D3 daily.   Recommended Vitamin B12 2500 mcg sublingually daily.     Patient doesn't need any medication for her sinus issues at this point, but if she develops symptoms such as green mucus or her symptoms get worse by the weekend, she will call us on Monday and we will prescribe her antibiotic at that time.     Recommended that if he experiences cold, headaches, and congestion, test for Covid within the first 2 days. Instructed to blow nose and use the mucus without cleaning for testing.    Recommended less caffeine due to " heartburn.     Prescribed Wegovy. Discussed its side-effects of nausea and constipation. Also recommended she eat more fibre foods and apples and less banana. If she tolerates it for the next 6-8 weeks, we will go ahead and increase the dose.     Discussed the importance of healthy diet and regular exercise regimen along with Wegovy for weight loss.  Reduce sugar in diet.      Recommended Pap smear every 5 years.     If anything worsens or changes please call us at once, follow up in the office as planned,       Scribe Attestation  By signing my name below, IMichelle, Scribe   attest that this documentation has been prepared under the direction and in the presence of Davin Fabian DO.

## 2025-01-03 ENCOUNTER — APPOINTMENT (OUTPATIENT)
Dept: PRIMARY CARE | Facility: CLINIC | Age: 60
End: 2025-01-03
Payer: COMMERCIAL

## 2025-01-03 VITALS
HEIGHT: 65 IN | TEMPERATURE: 97.9 F | HEART RATE: 71 BPM | WEIGHT: 171 LBS | BODY MASS INDEX: 28.49 KG/M2 | RESPIRATION RATE: 16 BRPM | SYSTOLIC BLOOD PRESSURE: 132 MMHG | DIASTOLIC BLOOD PRESSURE: 76 MMHG | OXYGEN SATURATION: 97 %

## 2025-01-03 DIAGNOSIS — E03.8 OTHER SPECIFIED HYPOTHYROIDISM: ICD-10-CM

## 2025-01-03 DIAGNOSIS — E55.9 VITAMIN D DEFICIENCY: ICD-10-CM

## 2025-01-03 DIAGNOSIS — E03.9 HYPOTHYROIDISM, UNSPECIFIED TYPE: Primary | ICD-10-CM

## 2025-01-03 DIAGNOSIS — E53.8 B12 DEFICIENCY: ICD-10-CM

## 2025-01-03 DIAGNOSIS — K21.9 GASTROESOPHAGEAL REFLUX DISEASE, UNSPECIFIED WHETHER ESOPHAGITIS PRESENT: ICD-10-CM

## 2025-01-03 PROCEDURE — 3008F BODY MASS INDEX DOCD: CPT | Performed by: FAMILY MEDICINE

## 2025-01-03 PROCEDURE — 99214 OFFICE O/P EST MOD 30 MIN: CPT | Performed by: FAMILY MEDICINE

## 2025-01-03 PROCEDURE — 1036F TOBACCO NON-USER: CPT | Performed by: FAMILY MEDICINE

## 2025-01-03 RX ORDER — SEMAGLUTIDE 0.25 MG/.5ML
0.25 INJECTION, SOLUTION SUBCUTANEOUS WEEKLY
Qty: 2 ML | Refills: 1 | Status: SHIPPED | OUTPATIENT
Start: 2025-01-03 | End: 2025-01-25

## 2025-01-03 RX ORDER — PANTOPRAZOLE SODIUM 40 MG/1
40 TABLET, DELAYED RELEASE ORAL DAILY
Qty: 90 TABLET | Refills: 1 | Status: SHIPPED | OUTPATIENT
Start: 2025-01-03

## 2025-01-03 RX ORDER — LEVOTHYROXINE SODIUM 75 UG/1
75 TABLET ORAL DAILY
Qty: 90 TABLET | Refills: 1 | Status: SHIPPED | OUTPATIENT
Start: 2025-01-03

## 2025-01-03 ASSESSMENT — PATIENT HEALTH QUESTIONNAIRE - PHQ9
SUM OF ALL RESPONSES TO PHQ9 QUESTIONS 1 AND 2: 0
1. LITTLE INTEREST OR PLEASURE IN DOING THINGS: NOT AT ALL
2. FEELING DOWN, DEPRESSED OR HOPELESS: NOT AT ALL

## 2025-01-07 ENCOUNTER — TELEPHONE (OUTPATIENT)
Dept: PRIMARY CARE | Facility: CLINIC | Age: 60
End: 2025-01-07
Payer: COMMERCIAL

## 2025-01-07 DIAGNOSIS — J01.10 ACUTE NON-RECURRENT FRONTAL SINUSITIS: Primary | ICD-10-CM

## 2025-01-07 NOTE — TELEPHONE ENCOUNTER
PATIENT AWARE THAT YOU ARE OUT OF THE OFFICE AND WANTED MESSAGE LEFT FOR WHEN YOU RETURN.    PLEASE ROUTE TO CLERICAL POOL      PATIENT IS CALLING - SAW YOU ON 1/3/2025 - SHE WAS STARTING TO GET SINUS ISSUES AT THIS VISIT - YOU WANTED TO KNOW IF SHE STARTED GETTING WORSE- SHE IS NOW EXPERIENCING GREENISH NASAL MUCOUS - WONDERING IF YOU CAN CALL HER IN SOMETHING?  PLEASE ADVISE.    PLEASE ROUTE TO CLERICAL POOL    [facility] IN [place] IS PREFERRED PHARMACY.

## 2025-01-08 RX ORDER — AZITHROMYCIN 250 MG/1
TABLET, FILM COATED ORAL
Qty: 6 TABLET | Refills: 0 | Status: SHIPPED | OUTPATIENT
Start: 2025-01-08 | End: 2025-01-13

## 2025-01-08 NOTE — TELEPHONE ENCOUNTER
DO Dana Santacruz; Do [id number] [facility] [id number] minutes ago (10:55 AM)       Antibiotic sent for her

## 2025-03-18 ENCOUNTER — OFFICE VISIT (OUTPATIENT)
Dept: PRIMARY CARE | Facility: CLINIC | Age: 60
End: 2025-03-18
Payer: COMMERCIAL

## 2025-03-18 VITALS
TEMPERATURE: 97.3 F | RESPIRATION RATE: 16 BRPM | BODY MASS INDEX: 27 KG/M2 | DIASTOLIC BLOOD PRESSURE: 79 MMHG | HEIGHT: 66 IN | OXYGEN SATURATION: 98 % | SYSTOLIC BLOOD PRESSURE: 138 MMHG | HEART RATE: 72 BPM | WEIGHT: 168 LBS

## 2025-03-18 DIAGNOSIS — H66.91 ACUTE OTITIS MEDIA, RIGHT: Primary | ICD-10-CM

## 2025-03-18 DIAGNOSIS — R09.81 CONGESTION OF NASAL SINUS: ICD-10-CM

## 2025-03-18 PROCEDURE — 99214 OFFICE O/P EST MOD 30 MIN: CPT | Performed by: NURSE PRACTITIONER

## 2025-03-18 RX ORDER — FLUTICASONE PROPIONATE 50 MCG
1 SPRAY, SUSPENSION (ML) NASAL DAILY
Qty: 16 G | Refills: 0 | Status: SHIPPED | OUTPATIENT
Start: 2025-03-18 | End: 2026-03-18

## 2025-03-18 RX ORDER — LEVOTHYROXINE SODIUM 75 UG/1
75 TABLET ORAL DAILY
COMMUNITY

## 2025-03-18 RX ORDER — LORATADINE 10 MG/1
10 TABLET ORAL DAILY
Qty: 30 TABLET | Refills: 2 | Status: SHIPPED | OUTPATIENT
Start: 2025-03-18 | End: 2025-06-16

## 2025-03-18 RX ORDER — AMOXICILLIN AND CLAVULANATE POTASSIUM 875; 125 MG/1; MG/1
875 TABLET, FILM COATED ORAL 2 TIMES DAILY
Qty: 20 TABLET | Refills: 0 | Status: SHIPPED | OUTPATIENT
Start: 2025-03-18 | End: 2025-03-28

## 2025-03-18 ASSESSMENT — ENCOUNTER SYMPTOMS
CHILLS: 0
COUGH: 0
ARTHRALGIAS: 0
ABDOMINAL DISTENTION: 0
EYE DISCHARGE: 0
ACTIVITY CHANGE: 0
DIAPHORESIS: 0
DIZZINESS: 0
RHINORRHEA: 1
WHEEZING: 0
PALPITATIONS: 0
AGITATION: 0
DIFFICULTY URINATING: 0
FATIGUE: 0
FEVER: 0
COLOR CHANGE: 0
HEADACHES: 0

## 2025-03-18 NOTE — PROGRESS NOTES
"HPI: Ear pain  Subjective   Patient ID: Diana Fournier is a 59 y.o. female who presents for Earache and Sinusitis.  Symptoms: right ear pain, sinus pressure, cough, post nasal drip, congestion  Length of symptoms: 2 day ago  OTC: coricidin with mild help  Related information:     Review of Systems   Constitutional:  Negative for activity change, chills, diaphoresis, fatigue and fever.   HENT:  Positive for congestion, ear pain, postnasal drip and rhinorrhea. Negative for ear discharge and nosebleeds.    Eyes:  Negative for discharge.   Respiratory:  Negative for cough and wheezing.    Cardiovascular:  Negative for chest pain and palpitations.   Gastrointestinal:  Negative for abdominal distention.   Genitourinary:  Negative for difficulty urinating.   Musculoskeletal:  Negative for arthralgias.   Skin:  Negative for color change.   Neurological:  Negative for dizziness and headaches.   Psychiatric/Behavioral:  Negative for agitation.        Objective   /79 Comment: auto  Pulse 72   Temp 36.3 °C (97.3 °F)   Resp 16   Ht 1.676 m (5' 6\")   Wt 76.2 kg (168 lb)   SpO2 98%   BMI 27.12 kg/m²     Physical Exam  Vitals and nursing note reviewed.   Constitutional:       Appearance: Normal appearance.   HENT:      Head: Normocephalic.      Ears:      Comments: Right tympanic membrane bulging, erythema and effusion. Left tympanic membrane free from effusion, erythema.      Nose: Congestion and rhinorrhea present.   Eyes:      Pupils: Pupils are equal, round, and reactive to light.   Cardiovascular:      Rate and Rhythm: Normal rate and regular rhythm.      Pulses: Normal pulses.      Heart sounds: Normal heart sounds.   Pulmonary:      Effort: Pulmonary effort is normal.      Breath sounds: Normal breath sounds.   Skin:     General: Skin is warm and dry.      Capillary Refill: Capillary refill takes less than 2 seconds.   Neurological:      Mental Status: She is alert and oriented to person, place, and time. "   Psychiatric:         Mood and Affect: Mood normal.         Behavior: Behavior normal.         Thought Content: Thought content normal.         Judgment: Judgment normal.         Assessment/Plan   Problem List Items Addressed This Visit    None  Visit Diagnoses         Codes    Acute otitis media, right    -  Primary H66.91    Relevant Medications    amoxicillin-pot clavulanate (Augmentin) 875-125 mg tablet    fluticasone (Flonase) 50 mcg/actuation nasal spray    loratadine (Claritin) 10 mg tablet    Congestion of nasal sinus     R09.81    Relevant Medications    fluticasone (Flonase) 50 mcg/actuation nasal spray    loratadine (Claritin) 10 mg tablet          Discussed diagnosis, treatment and anticipated course of illness with the patient who verbalized understanding. Instructed to take medications as prescribed. Follow up with primary care provider in the event signs and symptoms worsen or fail to improve with treatment plan.

## 2025-06-06 DIAGNOSIS — E03.8 OTHER SPECIFIED HYPOTHYROIDISM: ICD-10-CM

## 2025-06-09 RX ORDER — LEVOTHYROXINE SODIUM 75 UG/1
TABLET ORAL
Qty: 90 TABLET | Refills: 0 | Status: SHIPPED | OUTPATIENT
Start: 2025-06-09

## 2025-07-05 DIAGNOSIS — K21.9 GASTROESOPHAGEAL REFLUX DISEASE, UNSPECIFIED WHETHER ESOPHAGITIS PRESENT: ICD-10-CM

## 2025-07-07 RX ORDER — PANTOPRAZOLE SODIUM 40 MG/1
40 TABLET, DELAYED RELEASE ORAL DAILY
Qty: 30 TABLET | Refills: 0 | Status: SHIPPED | OUTPATIENT
Start: 2025-07-07

## 2025-07-16 ENCOUNTER — APPOINTMENT (OUTPATIENT)
Dept: PRIMARY CARE | Facility: CLINIC | Age: 60
End: 2025-07-16
Payer: COMMERCIAL

## 2025-07-23 ENCOUNTER — APPOINTMENT (OUTPATIENT)
Dept: PRIMARY CARE | Facility: CLINIC | Age: 60
End: 2025-07-23
Payer: COMMERCIAL

## 2025-08-02 DIAGNOSIS — K21.9 GASTROESOPHAGEAL REFLUX DISEASE, UNSPECIFIED WHETHER ESOPHAGITIS PRESENT: ICD-10-CM

## 2025-08-04 RX ORDER — PANTOPRAZOLE SODIUM 40 MG/1
40 TABLET, DELAYED RELEASE ORAL DAILY
Qty: 90 TABLET | Refills: 1 | Status: SHIPPED | OUTPATIENT
Start: 2025-08-04

## 2025-08-06 ENCOUNTER — APPOINTMENT (OUTPATIENT)
Dept: PRIMARY CARE | Facility: CLINIC | Age: 60
End: 2025-08-06
Payer: COMMERCIAL

## 2025-08-06 DIAGNOSIS — Z12.11 SCREENING FOR COLORECTAL CANCER: ICD-10-CM

## 2025-08-06 DIAGNOSIS — Z12.12 SCREENING FOR COLORECTAL CANCER: ICD-10-CM

## 2025-08-13 ENCOUNTER — APPOINTMENT (OUTPATIENT)
Dept: PRIMARY CARE | Facility: CLINIC | Age: 60
End: 2025-08-13
Payer: COMMERCIAL

## 2025-08-24 LAB — NONINV COLON CA DNA+OCC BLD SCRN STL QL: NEGATIVE
